# Patient Record
Sex: FEMALE | Race: WHITE | URBAN - METROPOLITAN AREA
[De-identification: names, ages, dates, MRNs, and addresses within clinical notes are randomized per-mention and may not be internally consistent; named-entity substitution may affect disease eponyms.]

---

## 2017-07-20 ENCOUNTER — HISTORICAL (OUTPATIENT)
Dept: ADMINISTRATIVE | Facility: HOSPITAL | Age: 42
End: 2017-07-20

## 2017-07-20 LAB
ABS NEUT (OLG): 5.53 X10(3)/MCL (ref 2.1–9.2)
ALBUMIN SERPL-MCNC: 4.2 GM/DL (ref 3.4–5)
ALBUMIN/GLOB SERPL: 1.2 RATIO (ref 1.1–2)
ALP SERPL-CCNC: 52 UNIT/L (ref 38–126)
ALT SERPL-CCNC: 17 UNIT/L (ref 12–78)
AST SERPL-CCNC: 8 UNIT/L (ref 15–37)
BASOPHILS # BLD AUTO: 0 X10(3)/MCL (ref 0–0.2)
BASOPHILS NFR BLD AUTO: 0 %
BILIRUB SERPL-MCNC: 0.2 MG/DL (ref 0.2–1)
BILIRUBIN DIRECT+TOT PNL SERPL-MCNC: 0 MG/DL (ref 0–0.5)
BILIRUBIN DIRECT+TOT PNL SERPL-MCNC: 0.2 MG/DL (ref 0–0.8)
BUN SERPL-MCNC: 19 MG/DL (ref 7–18)
CALCIUM SERPL-MCNC: 8.9 MG/DL (ref 8.5–10.1)
CHLORIDE SERPL-SCNC: 107 MMOL/L (ref 98–107)
CHOLEST SERPL-MCNC: 198 MG/DL (ref 0–200)
CHOLEST/HDLC SERPL: 3 {RATIO} (ref 0–4)
CO2 SERPL-SCNC: 27 MMOL/L (ref 21–32)
CREAT SERPL-MCNC: 0.61 MG/DL (ref 0.55–1.02)
EOSINOPHIL # BLD AUTO: 0.1 X10(3)/MCL (ref 0–0.9)
EOSINOPHIL NFR BLD AUTO: 2 %
ERYTHROCYTE [DISTWIDTH] IN BLOOD BY AUTOMATED COUNT: 12 % (ref 11.5–17)
GLOBULIN SER-MCNC: 3.4 GM/DL (ref 2.4–3.5)
GLUCOSE SERPL-MCNC: 102 MG/DL (ref 74–106)
HCT VFR BLD AUTO: 39.5 % (ref 37–47)
HDLC SERPL-MCNC: 66 MG/DL (ref 35–60)
HGB BLD-MCNC: 13.1 GM/DL (ref 12–16)
LDLC SERPL CALC-MCNC: 113 MG/DL (ref 0–129)
LYMPHOCYTES # BLD AUTO: 2.1 X10(3)/MCL (ref 0.6–4.6)
LYMPHOCYTES NFR BLD AUTO: 25 %
MCH RBC QN AUTO: 31.3 PG (ref 27–31)
MCHC RBC AUTO-ENTMCNC: 33.2 GM/DL (ref 33–36)
MCV RBC AUTO: 94.5 FL (ref 80–94)
MONOCYTES # BLD AUTO: 0.4 X10(3)/MCL (ref 0.1–1.3)
MONOCYTES NFR BLD AUTO: 5 %
NEUTROPHILS # BLD AUTO: 5.53 X10(3)/MCL (ref 2.1–9.2)
NEUTROPHILS NFR BLD AUTO: 67 %
PLATELET # BLD AUTO: 177 X10(3)/MCL (ref 130–400)
PMV BLD AUTO: 11.7 FL (ref 9.4–12.4)
POTASSIUM SERPL-SCNC: 5 MMOL/L (ref 3.5–5.1)
PROT SERPL-MCNC: 7.6 GM/DL (ref 6.4–8.2)
RBC # BLD AUTO: 4.18 X10(6)/MCL (ref 4.2–5.4)
SODIUM SERPL-SCNC: 142 MMOL/L (ref 136–145)
TRIGL SERPL-MCNC: 96 MG/DL (ref 30–150)
VLDLC SERPL CALC-MCNC: 19 MG/DL
WBC # SPEC AUTO: 8.2 X10(3)/MCL (ref 4.5–11.5)

## 2017-10-02 LAB
INFLUENZA A ANTIGEN, POC: NEGATIVE
INFLUENZA B ANTIGEN, POC: NEGATIVE

## 2019-01-10 ENCOUNTER — HISTORICAL (OUTPATIENT)
Dept: ADMINISTRATIVE | Facility: HOSPITAL | Age: 44
End: 2019-01-10

## 2019-01-15 ENCOUNTER — HISTORICAL (OUTPATIENT)
Dept: RADIOLOGY | Facility: HOSPITAL | Age: 44
End: 2019-01-15

## 2022-04-10 ENCOUNTER — HISTORICAL (OUTPATIENT)
Dept: ADMINISTRATIVE | Facility: HOSPITAL | Age: 47
End: 2022-04-10

## 2022-04-28 VITALS
WEIGHT: 147.94 LBS | HEIGHT: 70 IN | SYSTOLIC BLOOD PRESSURE: 128 MMHG | BODY MASS INDEX: 21.18 KG/M2 | OXYGEN SATURATION: 96 % | DIASTOLIC BLOOD PRESSURE: 88 MMHG

## 2022-05-04 NOTE — HISTORICAL [facility]ER
This is a historical note converted from Thom. Formatting and pictures may have been removed.  Please reference Thom for original formatting and attached multimedia. Chief Complaint  lower back pain -patient states the pain has been radiating down into her right buttock for 1 year but 2 weeks ago she picked up her son and now the pain radiates into the left buttock  History of Present Illness  Patient comes in today for her first visit. ?Patient complains of lower back pain. ?Patient states her back pain originally started about?2 years ago, occasionally shooting into her right buttock. ?She states now she is having shooting pain into her left buttock over the last 2 weeks after picking up her?4-year-old son.? She denies any bowel bladder dysfunction. ?She states it comes and goes. ?She denies any radiculopathy?into her thigh knee or foot. ?She is tried rest and medication without relief. ?She did have?1 year of seeing a chiropractor?without relief.  Physical Exam  Vitals & Measurements  BP:?132/80?  HT:?177?cm? HT:?177?cm? WT:?86?kg? WT:?86?kg? BMI:?27.45?  Patient is well-nourished well-developed female she is awake alert and oriented x3 she is in no apparent distress she is pleasant and cooperative. ?Examination of the lumbar spine she is tender to palpation about the paraspinal muscles both on the left and right side. ?She does?have shooting pain into the left and right buttock area. ?She is able to forward flex 90 degrees extend 20 degrees and rotate 30 degrees. ?She has 5 out of 5 strength in L2 through S1 states intact light touch negative clonus 2+ reflexes at L4 and S1 bilaterally.? There is no obvious long track signs today.? X-rays 3 views lumbar spine demonstrate spondylosis,?straightening of her lumbar?lordosis.  Assessment/Plan  1.?Lumbar spondylosis?M47.816  ? At this time we discussed her physical exam and x-ray findings. ?Patient is failed more conservative treatments of the last 2 years for her  lumbar spine. ?She does have shooting pain in both buttock area. ?She is already been treated?by?a chiropractor for over one year without relief. ?We will proceed with an MRI of her lumbar spine. ?I would like to see her back later this week for her results.? We have discussed some anti-inflammatories, muscle relaxers with appropriate precautions.  Ordered:  meloxicam, 15 mg = 1 tab(s), Oral, Daily, # 30 tab(s), 0 Refill(s), Pharmacy: [facility]  Clinic Follow up, *Est. 01/17/19 3:00:00 CST, Order for future visit, Lumbar spondylosis  Lumbar radiculitis  Lumbar herniated disc, [facility]  MRI Lumbar Spine W/O Contrast, Routine, 01/10/19 15:46:00 CST, Other (please specify), None, Patient Bed, Patient Has IV?, mri lumbar spine, Rad Type, Order for future visit, Lumbar spondylosis  Lumbar radiculitis  Lumbar herniated disc, Schedule this test, [facility]  Office/Outpatient Visit Level 4 New [id number] PC, Lumbar spondylosis  Lumbar radiculitis  Lumbar herniated disc, [facility], 01/10/19 15:46:00 CST  ?  2.?Lumbar radiculitis?M54.16  Ordered:  meloxicam, 15 mg = 1 tab(s), Oral, Daily, # 30 tab(s), 0 Refill(s), Pharmacy: [facility]  Clinic Follow up, *Est. 01/17/19 3:00:00 CST, Order for future visit, Lumbar spondylosis  Lumbar radiculitis  Lumbar herniated disc, [facility]  MRI Lumbar Spine W/O Contrast, Routine, 01/10/19 15:46:00 CST, Other (please specify), None, Patient Bed, Patient Has IV?, mri lumbar spine, Rad Type, Order for future visit, Lumbar spondylosis  Lumbar radiculitis  Lumbar herniated disc, Schedule this test, [facility]  Office/Outpatient Visit Level 4 New [id number] PC, Lumbar spondylosis  Lumbar radiculitis  Lumbar herniated disc, [facility], 01/10/19 15:46:00 CST  ?  3.?Lumbar herniated disc?M51.26  Ordered:  meloxicam, 15 mg = 1 tab(s), Oral, Daily, # 30 tab(s), 0 Refill(s), Pharmacy: [facility]  Clinic Follow up, *Est. 01/17/19 3:00:00 CST, Order for future visit, Lumbar spondylosis  Lumbar radiculitis  Lumbar herniated disc, [facility]  MRI Lumbar Spine W/O Contrast, Routine, 01/10/19 15:46:00 CST, Other (please specify), None, Patient Bed, Patient Has IV?, mri lumbar spine, Rad Type, Order for future visit, Lumbar spondylosis  Lumbar radiculitis  Lumbar herniated disc, Schedule this test, [facility]  Office/Outpatient Visit Level 4 New [id number] PC, Lumbar spondylosis  Lumbar radiculitis  Lumbar herniated disc, [facility], 01/10/19 15:46:00 CST  ?  Lumbar pain?M54.5  Ordered:  XR Spine Lumbar 2 or 3 Views, Routine, 01/10/19 15:27:00 CST, Other (please specify), None, Ambulatory, Patient Has IV?, Rad Type, Lumbar pain, Not Scheduled, 01/10/19 15:27:00 CST  ?  Orders:  meloxicam, 15 mg = 1 tab(s), Oral, Daily, # 30 tab(s), 0 Refill(s), Pharmacy: [facility] - [place], [place]   Problem List/Past Medical History  Ongoing  Anxiety  Back pain  Current smoker  Depression  Panic attacks  Historical  No qualifying data  Procedure/Surgical History  Thermal Ablation (.) (06/22/2016)  Tubal Ligation Laparoscopic (.) (06/22/2016)  laparoscopic - endometriosis (2009)  sinus surgery (2004)  appendectomy (1987)   Medications  Ambien 10 mg oral tablet, 1/2 tab(s), Oral, Once a day (at bedtime), PRN  Astelin 137 mcg/inh nasal spray, 274 mcg= 2 spray(s), Nasal, BID, 3 refills  azelastine 137 mcg/inh (0.1%) nasal spray, See Instructions, 3 refills  baclofen 10 mg oral tablet, See Instructions  Chantix Continuing Month 1 mg oral tablet, 1 mg= 1 tab(s), Oral, Daily  Chantix Starter Pack 0.5 mg-1 mg oral tablet, 1 tab(s), Oral, BID  Cheratussin AC 10 mg-100 mg/5 mL oral syrup, 5 mL, Oral, q4hr, PRN,? ?Not taking  Claritin oral tablet, 1 tab, Oral, Daily  Fish Oil oral capsule, 1 cap(s), Oral, Daily  fluticasone 50 mcg/inh nasal spray, See Instructions, 3 refills  ibuprofen 800 mg oral  tablet, 800 mg= 1 tab(s), Oral, q8hr  melatonin 1 mg oral tablet, 1 mg= 1 tab(s), Oral, Once a day (at bedtime), PRN  Mobic 15 mg oral tablet, 15 mg= 1 tab(s), Oral, Daily  Mobic 15 mg oral tablet, 15 mg= 1 tab(s), Oral, Daily  multivitamin with minerals (Adult Tab), 1 tab(s), Oral, Daily  Probiotic Formula oral capsule, 1 tab, Oral, Daily  Tobradex 0.3%-0.1% ophthalmic suspension, 1 drop(s), Eye-Both, QID  Zoloft 100 mg oral tablet, 200 mg= 2 tab(s), Oral, Daily  Allergies  No Known Allergies  Social History  Alcohol - Denies Alcohol Use, 11/26/2014  Current, Daily, 06/21/2016  Employment/School  Employed, 11/15/2016  Home/Environment  Lives with Children, Spouse., 11/15/2016  Nutrition/Health  Regular, 11/15/2016  Substance Abuse - Denies Substance Abuse, 11/26/2014  Never, 11/15/2016  Tobacco - Denies Tobacco Use, 11/26/2014  10 or more cigarettes (1/2 pack or more)/day in last 30 days, No, 01/10/2019  Current every day smoker, Cigarettes, 15 per day. 20 year(s)., 06/21/2016  Family History  Alzheimer disease: Negative: Father.  COPD (chronic obstructive pulmonary disease).: Mother.  Diabetes mellitus type 2: Father.  Immunizations  Vaccine Date Status   influenza virus vaccine, inactivated 10/24/2017 Given   Health Maintenance  Health Maintenance  ???Pending?(in the next year)  ??? ??Due?  ??? ? ? ?ADL Screening due??01/10/19??and every 1??year(s)  ??? ? ? ?Tetanus Vaccine due??01/10/19??and every 10??year(s)  ??? ??Due In Future?  ??? ? ? ?Alcohol Misuse Screening not due until??01/29/19??and every 1??year(s)  ???Satisfied?(in the past 1 year)  ??? ??Satisfied?  ??? ? ? ?Alcohol Misuse Screening on??01/29/18.??Satisfied by Lesa Llanes LPN  ??? ? ? ?Blood Pressure Screening on??01/10/19.??Satisfied by Niurka Mark  ??? ? ? ?Body Mass Index Check on??01/10/19.??Satisfied by Niurka Mark  ??? ? ? ?Depression Screening on??01/29/18.??Satisfied by Lesa Llanes LPN  ??? ? ? ?Obesity  Screening on??01/10/19.??Satisfied by Niurka Mark  ??? ? ? ?Smoking Cessation (Coronary Artery Disease) on??01/29/18.??Satisfied by Lesa Llanes LPN  ??? ??Postponed?  ??? ? ? ?Breast Cancer Screening (1 yr) on??01/29/18.??Recorded by Lesa Llanes LPN  ?  ?

## 2022-09-21 ENCOUNTER — HISTORICAL (OUTPATIENT)
Dept: ADMINISTRATIVE | Facility: HOSPITAL | Age: 47
End: 2022-09-21

## 2024-02-08 ENCOUNTER — LAB REQUISITION (OUTPATIENT)
Dept: LAB | Facility: HOSPITAL | Age: 49
End: 2024-02-08
Payer: COMMERCIAL

## 2024-02-08 DIAGNOSIS — R30.0 DYSURIA: ICD-10-CM

## 2024-02-08 PROCEDURE — 87086 URINE CULTURE/COLONY COUNT: CPT | Performed by: CHIROPRACTOR

## 2024-02-10 LAB — BACTERIA UR CULT: ABNORMAL

## 2024-06-10 ENCOUNTER — LAB REQUISITION (OUTPATIENT)
Dept: LAB | Facility: HOSPITAL | Age: 49
End: 2024-06-10
Payer: COMMERCIAL

## 2024-06-10 DIAGNOSIS — R30.0 DYSURIA: ICD-10-CM

## 2024-06-10 PROCEDURE — 87086 URINE CULTURE/COLONY COUNT: CPT | Performed by: OBSTETRICS & GYNECOLOGY

## 2024-06-12 LAB — BACTERIA UR CULT: NORMAL
